# Patient Record
Sex: FEMALE | Race: WHITE | URBAN - METROPOLITAN AREA
[De-identification: names, ages, dates, MRNs, and addresses within clinical notes are randomized per-mention and may not be internally consistent; named-entity substitution may affect disease eponyms.]

---
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
## 2023-03-26 ENCOUNTER — OFFICE VISIT (OUTPATIENT)
Dept: URGENT CARE | Age: 18
End: 2023-03-26

## 2023-03-26 VITALS
TEMPERATURE: 97.7 F | RESPIRATION RATE: 16 BRPM | WEIGHT: 170.4 LBS | BODY MASS INDEX: 33.45 KG/M2 | SYSTOLIC BLOOD PRESSURE: 112 MMHG | DIASTOLIC BLOOD PRESSURE: 75 MMHG | OXYGEN SATURATION: 99 % | HEIGHT: 60 IN | HEART RATE: 88 BPM

## 2023-03-26 DIAGNOSIS — R11.2 NAUSEA AND VOMITING, UNSPECIFIED VOMITING TYPE: Primary | ICD-10-CM

## 2023-03-26 LAB
BILIRUBIN, POC: NORMAL
BLOOD URINE, POC: NORMAL
CLARITY, POC: CLEAR
COLOR, POC: YELLOW
CONTROL: NORMAL
GLUCOSE URINE, POC: NORMAL
KETONES, POC: NORMAL
LEUKOCYTE EST, POC: NORMAL
NITRITE, POC: NORMAL
PH, POC: 6
PREGNANCY TEST URINE, POC: NEGATIVE
PROTEIN, POC: NORMAL
SPECIFIC GRAVITY, POC: 1.02
UROBILINOGEN, POC: NORMAL

## 2023-03-26 RX ORDER — ONDANSETRON 4 MG/1
4 TABLET, ORALLY DISINTEGRATING ORAL 3 TIMES DAILY PRN
Qty: 21 TABLET | Refills: 0 | Status: SHIPPED | OUTPATIENT
Start: 2023-03-26

## 2023-03-26 ASSESSMENT — ENCOUNTER SYMPTOMS
ABDOMINAL DISTENTION: 0
CONSTIPATION: 0
RHINORRHEA: 0
SINUS PRESSURE: 0
ABDOMINAL PAIN: 1
ANAL BLEEDING: 0
NAUSEA: 1
SINUS PAIN: 0
VOMITING: 1
BLOOD IN STOOL: 0
DIARRHEA: 1
RECTAL PAIN: 0

## 2023-03-26 NOTE — PATIENT INSTRUCTIONS
The 'BRAT' diet is suggested, then progress to diet as tolerated as symptoms tammi. See PCP if you develop bloody stools, persistent diarrhea, vomiting, fever or abdominal pain. Recommend bland diet, advance diet as tolerated.   Push fluids (water)  BRAT diet  B: bananas  R: Rice or rice cereal  A: Apples, applesauce  T: Toast, crackers

## 2023-03-26 NOTE — PROGRESS NOTES
Shade Bello (:  2005) is a 21 y.o. female,New patient, here for evaluation of the following chief complaint(s):  Emesis (Has been vomiting. She'll be ok but when she eats she'll puke. Has diarrhea, stomach in knots. Its been going on since yesterday at about 5pm. )    ASSESSMENT/PLAN:  1. Nausea and vomiting, unspecified vomiting type  The 'BRAT' diet is suggested, then progress to diet as tolerated as symptoms tammi. See PCP if you develop bloody stools, persistent diarrhea, vomiting, fever or abdominal pain. Advance diet as tolerated. Push fluids (water)  BRAT diet  B: bananas  R: Rice or rice cereal  A: Apples, applesauce  T: Toast, crackers  - POCT Urinalysis no Micro  - POCT urine pregnancy    - POCT Urinalysis no Micro  - POCT urine pregnancy  - ondansetron (ZOFRAN-ODT) 4 MG disintegrating tablet; Take 1 tablet by mouth 3 times daily as needed for Nausea or Vomiting  Dispense: 21 tablet; Refill: 0  Follow up in 7 days with PCP if symptoms persist or if symptoms worsen. SUBJECTIVE/OBJECTIVE:  21 y.o. female presents with symptoms of vomiting for two days. Not keeping anything down. Father and brother had same thing last week. Emesis   This is a new problem. The current episode started in the past 7 days. The problem occurs intermittently. The problem has been gradually worsening. The emesis has an appearance of stomach contents. The maximum temperature recorded prior to her arrival was 100.4 - 100.9 F. The fever has been present for 1 to 2 days. Associated symptoms include abdominal pain, chills, diarrhea and a fever. Pertinent negatives include no dizziness or headaches. Risk factors include ill contacts. She has tried nothing for the symptoms. Review of Systems   Constitutional:  Positive for chills and fever. Negative for diaphoresis and fatigue. HENT:  Negative for congestion, postnasal drip, rhinorrhea, sinus pressure and sinus pain.     Gastrointestinal:  Positive for

## 2023-03-26 NOTE — LETTER
March 26, 2023       Lorraine Laguna YOB: 2005   [address]  [facility] [address] Date of Visit:  3/26/2023       To Whom It May Concern: It is my medical opinion that Lender Fiddler should remain out of work until 3/28/23. If you have any questions or concerns, please don't hesitate to call.     Sincerely,        Mark Echols, APRN - CNP

## 2023-04-05 ENCOUNTER — OFFICE VISIT (OUTPATIENT)
Dept: URGENT CARE | Age: 18
End: 2023-04-05

## 2023-04-05 VITALS
SYSTOLIC BLOOD PRESSURE: 126 MMHG | BODY MASS INDEX: 33.23 KG/M2 | DIASTOLIC BLOOD PRESSURE: 80 MMHG | HEIGHT: 61 IN | HEART RATE: 82 BPM | WEIGHT: 176 LBS | OXYGEN SATURATION: 97 % | RESPIRATION RATE: 18 BRPM | TEMPERATURE: 98.1 F

## 2023-04-05 DIAGNOSIS — J06.9 VIRAL URI: Primary | ICD-10-CM

## 2023-04-05 LAB
Lab: NORMAL
PERFORMING INSTRUMENT: NORMAL
QC PASS/FAIL: NORMAL
SARS-COV-2, POC: NORMAL
STREPTOCOCCUS A RNA: NEGATIVE

## 2023-04-05 RX ORDER — BROMPHENIRAMINE MALEATE, PSEUDOEPHEDRINE HYDROCHLORIDE, AND DEXTROMETHORPHAN HYDROBROMIDE 2; 30; 10 MG/5ML; MG/5ML; MG/5ML
SYRUP ORAL
Qty: 200 ML | Refills: 0 | Status: SHIPPED | OUTPATIENT
Start: 2023-04-05

## 2023-04-05 ASSESSMENT — ENCOUNTER SYMPTOMS
VOMITING: 0
COUGH: 1
NAUSEA: 0
CONSTIPATION: 0
SORE THROAT: 1
EYES NEGATIVE: 1
DIARRHEA: 1
SINUS PAIN: 1
SINUS PRESSURE: 1

## 2023-04-05 NOTE — PROGRESS NOTES
Jeanne Seymour (:  2005) is a 21 y.o. female,New patient, here for evaluation of the following chief complaint(s):  Fever and Pharyngitis (Believes she had recent strep exposure)      ASSESSMENT/PLAN:    ICD-10-CM    1. Viral URI  J06.9 POCT Rapid Strep A DNA (Alere i)     POCT COVID-19, Antigen     brompheniramine-pseudoephedrine-DM 2-30-10 MG/5ML syrup      POC COVID test negative   Results for POC orders placed in visit on 23   POCT Rapid Strep A DNA (Alere i)   Result Value Ref Range    Streptococcus A RNA negative          Reviewed AVS with patient. All questions answered    Return if symptoms worsen or fail to improve. SUBJECTIVE/OBJECTIVE:  21year old female presents with c/o sinus congestion and sore throat for 24 hours. She states that she has had chills al day. Denies known fever. Has exposure to her family with similar symptoms. She has not been treating with OTC medications. History provided by:  Patient   used: No      Vitals:    23 1627   BP: 126/80   Site: Left Upper Arm   Position: Sitting   Cuff Size: Medium Adult   Pulse: 82   Resp: 18   Temp: 98.1 °F (36.7 °C)   TempSrc: Oral   SpO2: 97%   Weight: 176 lb (79.8 kg)   Height: 5' 1\" (1.549 m)       Review of Systems   Constitutional:  Positive for chills and fatigue. Negative for appetite change and fever. HENT:  Positive for congestion, sinus pressure, sinus pain and sore throat. Negative for ear pain. Eyes: Negative. Respiratory:  Positive for cough. Congested non-productive cough    Cardiovascular: Negative. Gastrointestinal:  Positive for diarrhea. Negative for constipation, nausea and vomiting. Neurological:  Positive for headaches. Physical Exam  Vitals reviewed. Constitutional:       General: She is not in acute distress. Appearance: She is obese. She is ill-appearing. HENT:      Head: Normocephalic.       Right Ear: Tympanic membrane, ear canal and

## 2023-04-05 NOTE — LETTER
April 5, 2023       Rylee Lockie Ground YOB: 2005   [address] [facility],[address] Date of Visit:  4/5/2023       To Whom It May Concern: It is my medical opinion that Philip Standing may return to work on 4/8/23. If you have any questions or concerns, please don't hesitate to call.     Sincerely,          PANCHO Herrera - CNP

## 2023-08-18 ENCOUNTER — OFFICE VISIT (OUTPATIENT)
Dept: URGENT CARE | Age: 18
End: 2023-08-18

## 2023-08-18 ENCOUNTER — HOSPITAL ENCOUNTER (EMERGENCY)
Age: 18
Discharge: HOME | End: 2023-08-18
Payer: COMMERCIAL

## 2023-08-18 VITALS
TEMPERATURE: 99.3 F | HEART RATE: 97 BPM | OXYGEN SATURATION: 100 % | SYSTOLIC BLOOD PRESSURE: 112 MMHG | HEIGHT: 61 IN | WEIGHT: 190 LBS | DIASTOLIC BLOOD PRESSURE: 74 MMHG | RESPIRATION RATE: 16 BRPM | BODY MASS INDEX: 35.87 KG/M2

## 2023-08-18 VITALS
TEMPERATURE: 98.5 F | SYSTOLIC BLOOD PRESSURE: 100 MMHG | OXYGEN SATURATION: 95 % | WEIGHT: 192 LBS | BODY MASS INDEX: 37.69 KG/M2 | RESPIRATION RATE: 18 BRPM | DIASTOLIC BLOOD PRESSURE: 68 MMHG | HEART RATE: 61 BPM | HEIGHT: 60 IN

## 2023-08-18 DIAGNOSIS — T14.8XXA INFECTED WOUND: Primary | ICD-10-CM

## 2023-08-18 DIAGNOSIS — L08.9 INFECTED WOUND: Primary | ICD-10-CM

## 2023-08-18 DIAGNOSIS — L03.90 CELLULITIS OF SKIN: ICD-10-CM

## 2023-08-18 DIAGNOSIS — L03.113 CELLULITIS OF RIGHT UPPER EXTREMITY: ICD-10-CM

## 2023-08-18 PROCEDURE — 6370000000 HC RX 637 (ALT 250 FOR IP): Performed by: PHYSICIAN ASSISTANT

## 2023-08-18 PROCEDURE — 99283 EMERGENCY DEPT VISIT LOW MDM: CPT

## 2023-08-18 RX ORDER — CLINDAMYCIN HYDROCHLORIDE 150 MG/1
300 CAPSULE ORAL ONCE
Status: COMPLETED | OUTPATIENT
Start: 2023-08-18 | End: 2023-08-18

## 2023-08-18 RX ORDER — ONDANSETRON 4 MG/1
4 TABLET, ORALLY DISINTEGRATING ORAL ONCE
Status: COMPLETED | OUTPATIENT
Start: 2023-08-18 | End: 2023-08-18

## 2023-08-18 RX ORDER — CLINDAMYCIN HYDROCHLORIDE 300 MG/1
300 CAPSULE ORAL 3 TIMES DAILY
Qty: 21 CAPSULE | Refills: 0 | Status: SHIPPED | OUTPATIENT
Start: 2023-08-18 | End: 2023-08-25

## 2023-08-18 RX ORDER — DEXTROAMPHETAMINE SACCHARATE, AMPHETAMINE ASPARTATE MONOHYDRATE, DEXTROAMPHETAMINE SULFATE AND AMPHETAMINE SULFATE 5; 5; 5; 5 MG/1; MG/1; MG/1; MG/1
2 CAPSULE, EXTENDED RELEASE ORAL
COMMUNITY
Start: 2023-05-13

## 2023-08-18 RX ORDER — ONDANSETRON 4 MG/1
4 TABLET, FILM COATED ORAL EVERY 8 HOURS PRN
Qty: 10 TABLET | Refills: 0 | Status: SHIPPED | OUTPATIENT
Start: 2023-08-18

## 2023-08-18 RX ORDER — CEPHALEXIN 500 MG/1
500 CAPSULE ORAL 4 TIMES DAILY
Qty: 28 CAPSULE | Refills: 0 | Status: SHIPPED | OUTPATIENT
Start: 2023-08-18 | End: 2023-08-20

## 2023-08-18 RX ADMIN — ONDANSETRON 4 MG: 4 TABLET, ORALLY DISINTEGRATING ORAL at 18:56

## 2023-08-18 RX ADMIN — CLINDAMYCIN HYDROCHLORIDE 300 MG: 150 CAPSULE ORAL at 18:56

## 2023-08-18 ASSESSMENT — LIFESTYLE VARIABLES
HOW MANY STANDARD DRINKS CONTAINING ALCOHOL DO YOU HAVE ON A TYPICAL DAY: PATIENT DOES NOT DRINK
HOW OFTEN DO YOU HAVE A DRINK CONTAINING ALCOHOL: NEVER

## 2023-08-18 ASSESSMENT — ENCOUNTER SYMPTOMS: RESPIRATORY NEGATIVE: 1

## 2023-08-18 ASSESSMENT — PAIN - FUNCTIONAL ASSESSMENT: PAIN_FUNCTIONAL_ASSESSMENT: NONE - DENIES PAIN

## 2023-08-18 NOTE — ED PROVIDER NOTES
[address] [address] ED  EMERGENCY DEPARTMENT ENCOUNTER        Pt Name: Yariel Davalos  MRN: [id number]  [address] [facility] 2005  Date of evaluation: 8/18/2023  Provider: Bart Benito PA-C  PCP: Glo Doe  Note Started: 6:59 PM EDT 8/18/23      AL. I have evaluated this patient. 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 Pt went to urgent care was given antibiotics, and right after she left started c/o fever body aches muscle pain and n/v. But then stated earlier at 2pm fever was 102 orally and took tylenol at that time. HISTORY OF PRESENT ILLNESS: 1 or more Elements     History From: Patient  Limitations to history : None    Rylee Vanita Munster is a 21 y.o. female who presents to the emergency department for evaluation of scrape on her arm that has become red painful and swollen went to urgent care today was started on Keflex states after taking the medication made her sick and vomited. No rash. No trouble breathing or swallowing. Nursing Notes were all reviewed and agreed with or any disagreements were addressed in the HPI. REVIEW OF SYSTEMS :      Review of Systems    Positives and Pertinent negatives as per HPI. SURGICAL HISTORY     Past Surgical History:   Procedure Laterality Date    HAND SURGERY Left 05/23/2017    CO2 laser    OTHER SURGICAL HISTORY      co2 laser       CURRENTMEDICATIONS       Discharge Medication List as of 8/18/2023  7:17 PM        CONTINUE these medications which have NOT CHANGED    Details   amphetamine-dextroamphetamine (ADDERALL XR) 20 MG extended release capsule Take 2 capsules by mouth. Historical Med      cephALEXin (KEFLEX) 500 MG capsule Take 1 capsule by mouth 4 times daily for 7 days, Disp-28 capsule, R-0Normal      mupirocin (BACTROBAN) 2 % ointment Apply topically 3 times daily for 7 days. , Disp-1 each, 1. Infected wound    2.  Cellulitis of skin          DISPOSITION/PLAN     DISPOSITION Decision to Discharge    PATIENT REFERRED TO:  Lorie Doe  [id number]  [address]    In 3 days      [facility]  [facility]  [phone]    If symptoms worsen      DISCHARGE MEDICATIONS:  Discharge Medication List as of 8/18/2023  7:17 PM        START taking these medications    Details   clindamycin (CLEOCIN) 300 MG capsule Take 1 capsule by mouth 3 times daily for 7 days, Disp-21 capsule, R-0Normal      ondansetron (ZOFRAN) 4 MG tablet Take 1 tablet by mouth every 8 hours as needed for Nausea, Disp-10 tablet, R-0Normal             DISCONTINUED MEDICATIONS:  Discharge Medication List as of 8/18/2023  7:17 PM                 (Please note that portions of this note were completed with a voice recognition program.  Efforts were made to edit the dictations but occasionally words are mis-transcribed.)    Ld Crawford PA-C (electronically signed)           Omar Morgan PA-C  08/18/23 1946

## 2023-08-19 ENCOUNTER — HOSPITAL ENCOUNTER (EMERGENCY)
Age: 18
Discharge: HOME | End: 2023-08-20
Attending: STUDENT IN AN ORGANIZED HEALTH CARE EDUCATION/TRAINING PROGRAM
Payer: COMMERCIAL

## 2023-08-19 VITALS
HEART RATE: 96 BPM | WEIGHT: 193 LBS | HEIGHT: 61 IN | RESPIRATION RATE: 16 BRPM | TEMPERATURE: 98.2 F | DIASTOLIC BLOOD PRESSURE: 71 MMHG | OXYGEN SATURATION: 98 % | BODY MASS INDEX: 36.44 KG/M2 | SYSTOLIC BLOOD PRESSURE: 119 MMHG

## 2023-08-19 DIAGNOSIS — L03.113 CELLULITIS OF RIGHT UPPER EXTREMITY: Primary | ICD-10-CM

## 2023-08-19 PROCEDURE — 99284 EMERGENCY DEPT VISIT MOD MDM: CPT

## 2023-08-19 ASSESSMENT — PAIN DESCRIPTION - ORIENTATION: ORIENTATION: RIGHT

## 2023-08-19 ASSESSMENT — PAIN DESCRIPTION - DESCRIPTORS: DESCRIPTORS: THROBBING

## 2023-08-19 ASSESSMENT — PAIN - FUNCTIONAL ASSESSMENT: PAIN_FUNCTIONAL_ASSESSMENT: 0-10

## 2023-08-19 ASSESSMENT — PAIN DESCRIPTION - PAIN TYPE: TYPE: ACUTE PAIN

## 2023-08-19 ASSESSMENT — PAIN DESCRIPTION - LOCATION: LOCATION: ARM

## 2023-08-20 ENCOUNTER — APPOINTMENT (OUTPATIENT)
Dept: GENERAL RADIOLOGY | Age: 18
End: 2023-08-20
Payer: COMMERCIAL

## 2023-08-20 LAB — HCG UR QL: NEGATIVE

## 2023-08-20 PROCEDURE — 84703 CHORIONIC GONADOTROPIN ASSAY: CPT

## 2023-08-20 PROCEDURE — 73090 X-RAY EXAM OF FOREARM: CPT

## 2023-08-20 PROCEDURE — 6370000000 HC RX 637 (ALT 250 FOR IP): Performed by: STUDENT IN AN ORGANIZED HEALTH CARE EDUCATION/TRAINING PROGRAM

## 2023-08-20 RX ORDER — CEFUROXIME AXETIL 500 MG/1
500 TABLET ORAL 2 TIMES DAILY
Qty: 20 TABLET | Refills: 0 | Status: SHIPPED | OUTPATIENT
Start: 2023-08-20 | End: 2023-08-30

## 2023-08-20 RX ORDER — SULFAMETHOXAZOLE AND TRIMETHOPRIM 800; 160 MG/1; MG/1
1 TABLET ORAL ONCE
Status: COMPLETED | OUTPATIENT
Start: 2023-08-20 | End: 2023-08-20

## 2023-08-20 RX ORDER — SULFAMETHOXAZOLE AND TRIMETHOPRIM 800; 160 MG/1; MG/1
1 TABLET ORAL 2 TIMES DAILY
Qty: 14 TABLET | Refills: 0 | Status: SHIPPED | OUTPATIENT
Start: 2023-08-20 | End: 2023-08-27

## 2023-08-20 RX ORDER — CEFUROXIME AXETIL 250 MG/1
500 TABLET ORAL ONCE
Status: COMPLETED | OUTPATIENT
Start: 2023-08-20 | End: 2023-08-20

## 2023-08-20 RX ADMIN — SULFAMETHOXAZOLE AND TRIMETHOPRIM 1 TABLET: 800; 160 TABLET ORAL at 01:10

## 2023-08-20 RX ADMIN — CEFUROXIME AXETIL 500 MG: 250 TABLET ORAL at 01:10

## 2023-08-20 NOTE — ED TRIAGE NOTES
Pt states that she was here yesterday and was diagnosed with cellulitis and was given clindamycin. Pt states that the wound has gotten much bigger and worse. She has taken 2 of her abx.

## 2023-08-20 NOTE — DISCHARGE INSTRUCTIONS
Follow-up with primary doctor on Monday for reevaluation. Return to emergency department for any worsening redness, worsening pain any fevers, chest pain or shortness of breath, lightheadedness or dizziness or any new change or worsening symptoms were always here for evaluation never hesitate to return.

## 2023-08-21 ENCOUNTER — HOSPITAL ENCOUNTER (EMERGENCY)
Age: 18
Discharge: HOME | End: 2023-08-21
Attending: STUDENT IN AN ORGANIZED HEALTH CARE EDUCATION/TRAINING PROGRAM
Payer: COMMERCIAL

## 2023-08-21 VITALS
HEIGHT: 61 IN | HEART RATE: 63 BPM | TEMPERATURE: 98.4 F | DIASTOLIC BLOOD PRESSURE: 93 MMHG | RESPIRATION RATE: 16 BRPM | OXYGEN SATURATION: 97 % | WEIGHT: 193 LBS | BODY MASS INDEX: 36.44 KG/M2 | SYSTOLIC BLOOD PRESSURE: 125 MMHG

## 2023-08-21 DIAGNOSIS — L03.113 CELLULITIS OF RIGHT UPPER EXTREMITY: Primary | ICD-10-CM

## 2023-08-21 LAB
ALBUMIN SERPL-MCNC: 4.1 G/DL (ref 3.4–5)
ALBUMIN/GLOB SERPL: 1.4 {RATIO} (ref 1.1–2.2)
ALP SERPL-CCNC: 106 U/L (ref 40–129)
ALT SERPL-CCNC: 34 U/L (ref 10–40)
ANION GAP SERPL CALCULATED.3IONS-SCNC: 12 MMOL/L (ref 3–16)
AST SERPL-CCNC: 20 U/L (ref 15–37)
BASOPHILS # BLD: 0.1 K/UL (ref 0–0.2)
BASOPHILS NFR BLD: 0.5 %
BILIRUB SERPL-MCNC: <0.2 MG/DL (ref 0–1)
BUN SERPL-MCNC: 11 MG/DL (ref 7–20)
CALCIUM SERPL-MCNC: 9.2 MG/DL (ref 8.3–10.6)
CHLORIDE SERPL-SCNC: 104 MMOL/L (ref 99–110)
CK SERPL-CCNC: 89 U/L (ref 26–192)
CO2 SERPL-SCNC: 23 MMOL/L (ref 21–32)
CREAT SERPL-MCNC: 0.8 MG/DL (ref 0.6–1.1)
DEPRECATED RDW RBC AUTO: 13.3 % (ref 12.4–15.4)
EOSINOPHIL # BLD: 0.1 K/UL (ref 0–0.6)
EOSINOPHIL NFR BLD: 1.2 %
GFR SERPLBLD CREATININE-BSD FMLA CKD-EPI: >60 ML/MIN/{1.73_M2}
GLUCOSE SERPL-MCNC: 103 MG/DL (ref 70–99)
HCT VFR BLD AUTO: 38 % (ref 36–48)
HGB BLD-MCNC: 12.9 G/DL (ref 12–16)
LACTATE BLDV-SCNC: 0.9 MMOL/L (ref 0.4–2)
LYMPHOCYTES # BLD: 3.4 K/UL (ref 1–5.1)
LYMPHOCYTES NFR BLD: 26.6 %
MCH RBC QN AUTO: 29.1 PG (ref 26–34)
MCHC RBC AUTO-ENTMCNC: 33.9 G/DL (ref 31–36)
MCV RBC AUTO: 85.8 FL (ref 80–100)
MONOCYTES # BLD: 1.1 K/UL (ref 0–1.3)
MONOCYTES NFR BLD: 8.9 %
NEUTROPHILS # BLD: 8.1 K/UL (ref 1.7–7.7)
NEUTROPHILS NFR BLD: 62.8 %
PLATELET # BLD AUTO: 247 K/UL (ref 135–450)
PMV BLD AUTO: 8.5 FL (ref 5–10.5)
POTASSIUM SERPL-SCNC: 3.9 MMOL/L (ref 3.5–5.1)
PROT SERPL-MCNC: 7.1 G/DL (ref 6.4–8.2)
RBC # BLD AUTO: 4.44 M/UL (ref 4–5.2)
SODIUM SERPL-SCNC: 139 MMOL/L (ref 136–145)
WBC # BLD AUTO: 12.8 K/UL (ref 4–11)

## 2023-08-21 PROCEDURE — 99283 EMERGENCY DEPT VISIT LOW MDM: CPT

## 2023-08-21 PROCEDURE — 36415 COLL VENOUS BLD VENIPUNCTURE: CPT

## 2023-08-21 PROCEDURE — 83605 ASSAY OF LACTIC ACID: CPT

## 2023-08-21 PROCEDURE — 80053 COMPREHEN METABOLIC PANEL: CPT

## 2023-08-21 PROCEDURE — 82550 ASSAY OF CK (CPK): CPT

## 2023-08-21 PROCEDURE — 85025 COMPLETE CBC W/AUTO DIFF WBC: CPT

## 2023-08-21 ASSESSMENT — PAIN SCALES - GENERAL: PAINLEVEL_OUTOF10: 5

## 2023-08-21 ASSESSMENT — PAIN - FUNCTIONAL ASSESSMENT: PAIN_FUNCTIONAL_ASSESSMENT: 0-10

## 2023-08-21 ASSESSMENT — PAIN DESCRIPTION - LOCATION: LOCATION: ARM

## 2023-08-21 ASSESSMENT — PAIN DESCRIPTION - ORIENTATION: ORIENTATION: RIGHT

## 2023-08-21 ASSESSMENT — PAIN DESCRIPTION - PAIN TYPE: TYPE: ACUTE PAIN

## 2023-08-21 NOTE — ED TRIAGE NOTES
Pt was here yesterday and the day before for cellulitis of the Right upper extremity. Pt c/o tonight of arm being bruised.

## 2023-08-21 NOTE — DISCHARGE INSTRUCTIONS
Follow-up with primary doctor on Monday or Tuesday for reevaluation. Return to emergency department for worsening redness, worsening swelling, worsening pain any motor or sensory changes, chest pain or shortness of breath, fevers or any new change or worsening symptoms were always here for evaluation never hesitate to return. Continue taking cefuroxime and Bactrim that I prescribed yesterday as the redness does seem to be improving.

## 2023-08-31 NOTE — ED PROVIDER NOTES
[address] [place] [address]  [phone]    If symptoms worsen    Disposition medications (if applicable):  Discharge Medication List as of 8/20/2023  1:12 AM        START taking these medications    Details   cefUROXime (CEFTIN) 500 MG tablet Take 1 tablet by mouth 2 times daily for 10 days, Disp-20 tablet, R-0Normal      sulfamethoxazole-trimethoprim (BACTRIM DS;SEPTRA DS) 800-160 MG per tablet Take 1 tablet by mouth 2 times daily for 7 days, Disp-14 tablet, R-0Normal           ED Provider Disposition Time  DISPOSITION Decision To Discharge 08/20/2023 01:07:09 AM      Comment: Please note this report has been produced using speech recognition software and may contain errors related to that system including errors in grammar, punctuation, and spelling, as well as words and phrases that may be inappropriate. Efforts were made to edit the dictations.         Dinesh Rhodes MD  08/31/23 0203

## 2023-08-31 NOTE — ED PROVIDER NOTES
Albumin/Globulin Ratio 1.4 1.1 - 2.2    Total Bilirubin <0.2 0.0 - 1.0 mg/dL    Alkaline Phosphatase 106 40 - 129 U/L    ALT 34 10 - 40 U/L    AST 20 15 - 37 U/L   Lactic Acid   Result Value Ref Range    Lactic Acid 0.9 0.4 - 2.0 mmol/L   CK   Result Value Ref Range    Total CK 89 26 - 192 U/L      Radiographs (if obtained):  Radiologist's Report Reviewed:  No results found. MDM:    20-year-old female present with history seen above. Vitals on presentation reassuring and patient afebrile satting 1 room air. Physical exam can be seen above. Patient is neurovascular intact. Erythema actually seems to be improving. I discussed with patient that cellulitis actually seems to be improving. I discussed we can obtain laboratory evaluation and then after labs I can review labs and discuss CT imaging. Patient has a mild leukocytosis of 12.8. Hemoglobin is within normal limits. CMP is overall reassuring and nonactionable. Lactate is not elevated CK is within normal limits. This will cellulitis is not rapidly progressing and actually improving. I offered CT imaging to patient but discussion with her and shared decision making of risk of radiation we will hold off on CT imaging at this time and continue oral antibiotics with strict return precautions and close follow-up. Patient is agreeable this plan she is agreeable to follow-up and return precautions patient discharged home. Clinical Impression:  1.  Cellulitis of right upper extremity      Disposition referral (if applicable):  Yumiko Doe  [phone]  [address]    In 1 day      [facility] PHYSICAL [facility] ED  [facility]  [phone]    If symptoms worsen    Disposition medications (if applicable):  Discharge Medication List as of 8/21/2023  2:27 AM        ED Provider Disposition Time  DISPOSITION Decision To Discharge 08/21/2023 02:23:55 AM      Comment: Please note this report has

## 2024-02-27 ENCOUNTER — OFFICE VISIT (OUTPATIENT)
Dept: URGENT CARE | Age: 19
End: 2024-02-27

## 2024-02-27 VITALS
HEART RATE: 70 BPM | SYSTOLIC BLOOD PRESSURE: 106 MMHG | WEIGHT: 220 LBS | TEMPERATURE: 97.4 F | BODY MASS INDEX: 43.19 KG/M2 | DIASTOLIC BLOOD PRESSURE: 70 MMHG | OXYGEN SATURATION: 96 % | HEIGHT: 60 IN

## 2024-02-27 DIAGNOSIS — R21 RASH: Primary | ICD-10-CM

## 2024-02-27 PROCEDURE — 99282 EMERGENCY DEPT VISIT SF MDM: CPT

## 2024-02-27 RX ORDER — METHYLPREDNISOLONE 4 MG/1
TABLET ORAL
Qty: 1 KIT | Refills: 0 | Status: SHIPPED | OUTPATIENT
Start: 2024-02-27

## 2024-02-27 RX ORDER — FLUOXETINE 10 MG/1
10 CAPSULE ORAL DAILY
COMMUNITY

## 2024-02-27 NOTE — PATIENT INSTRUCTIONS
Medrol Dose pack prescribed for steroid treatment - take as directed.  Can use an oral antihistamine to help with itching - Claritin, Zyrtec, or Allegra  Cool compresses, or epsom salt soaked compresses can also help.    Follow up with your PCP within 7 days or, if unable, you can return to the clinic if symptoms persist beyond 7 days or if symptoms worsen.    New Prescriptions    METHYLPREDNISOLONE (MEDROL DOSEPACK) 4 MG TABLET    Take by mouth.

## 2024-02-27 NOTE — PROGRESS NOTES
Rylee Kay Spears (: 2005) is a 18 y.o. female, Established patient, here for evaluation of the following chief complaint(s):  Insect Bite (Pt states she has bites all over body from her waist down. Super itchy.  Noticed them this morning and she states they are painful too. )      ASSESSMENT/PLAN:    ICD-10-CM    1. Rash  R21 methylPREDNISolone (MEDROL DOSEPACK) 4 MG tablet          Exam and history consistent with insect bites vs mild urticarial rash.  Differential also includes contact dermatitis given the clear borders of the rash.  Low concern for burns, cellulitis, folliculitis, and ringworm.  Medrol dose pack prescribed for systemic treatment given diffuse presentation over both legs.  Recommend OTC oral antihistamines to help with itching   Also recommend cool compresses, or epsom salt soaked compresses    Follow up in 7 days if symptoms persist or if symptoms worsen.  The patient tolerated their visit well. The patient and/or the family were informed of the results of any tests, a time was given to answer questions, a plan was proposed and they agreed with plan. Reviewed AVS with treatment instructions and answered questions - pt/family expresses understanding and agreement with the discussed treatment plan and AVS instructions.    SUBJECTIVE/OBJECTIVE:  HPI:   18 y.o. female presents for complaint of insect bites? Down both legs x last night.  States she was outdoors last night due to the good weather, states they developed overnight.   Pt states she was wearing long pants yesterday. Denies seeing any insects, such as mosquitos, while out.    Denies any new soaps, detergents, lotions, medications, or foods.    Admits symptoms of itchy red bumps down both legs starting at the buttocks.   Denies symptoms of fevers, sweats, chills, nausea, vomiting, diarrhea.    Nothing makes symptoms better.  Scratching makes symptoms worse.    Has taken benadryl and calamine lotion for symptoms without any noted

## 2024-02-28 ENCOUNTER — HOSPITAL ENCOUNTER (EMERGENCY)
Age: 19
Discharge: HOME | End: 2024-02-28
Attending: EMERGENCY MEDICINE
Payer: COMMERCIAL

## 2024-02-28 VITALS
WEIGHT: 210 LBS | BODY MASS INDEX: 41.23 KG/M2 | TEMPERATURE: 98.3 F | HEIGHT: 60 IN | SYSTOLIC BLOOD PRESSURE: 135 MMHG | RESPIRATION RATE: 18 BRPM | HEART RATE: 65 BPM | OXYGEN SATURATION: 97 % | DIASTOLIC BLOOD PRESSURE: 87 MMHG

## 2024-02-28 DIAGNOSIS — L30.9 DERMATITIS: Primary | ICD-10-CM

## 2024-02-28 ASSESSMENT — PAIN - FUNCTIONAL ASSESSMENT
PAIN_FUNCTIONAL_ASSESSMENT: NONE - DENIES PAIN
PAIN_FUNCTIONAL_ASSESSMENT: NONE - DENIES PAIN

## 2024-02-28 NOTE — DISCHARGE INSTRUCTIONS
We cannot verify bedbugs at this time but your rash does most closely resemble bedbug bites.  See enclosed instruction sheet to help with information regarding bedbug.  Start your steroids as prescribed by the other facility

## 2024-02-28 NOTE — ED PROVIDER NOTES
AMPHETAMINE-DEXTROAMPHETAMINE (ADDERALL XR) 20 MG EXTENDED RELEASE CAPSULE    Take 2 capsules by mouth.    BROMPHENIRAMINE-PSEUDOEPHEDRINE-DM 2-30-10 MG/5ML SYRUP    Take 10 ml every 6 hours as needed for cough and congestion for 5 days    FLUOXETINE (PROZAC) 10 MG CAPSULE    Take 1 capsule by mouth daily    METHYLPREDNISOLONE (MEDROL DOSEPACK) 4 MG TABLET    Take by mouth.    ONDANSETRON (ZOFRAN) 4 MG TABLET    Take 1 tablet by mouth every 8 hours as needed for Nausea    ONDANSETRON (ZOFRAN-ODT) 4 MG DISINTEGRATING TABLET    Take 1 tablet by mouth 3 times daily as needed for Nausea or Vomiting       ALLERGIES     Patient has no known allergies.    FAMILY HISTORY     History reviewed. No pertinent family history.       SOCIAL HISTORY       Social History     Socioeconomic History    Marital status: Single     Spouse name: None    Number of children: None    Years of education: None    Highest education level: None   Tobacco Use    Smoking status: Never    Smokeless tobacco: Never   Vaping Use    Vaping Use: Every day    Substances: Nicotine    Devices: Disposable   Substance and Sexual Activity    Alcohol use: Yes     Alcohol/week: 1.0 - 2.0 standard drink of alcohol     Types: 1 - 2 Standard drinks or equivalent per week     Comment: rare    Drug use: Yes     Types: Marijuana (Weed)    Sexual activity: Never       SCREENINGS                               CIWA Assessment  BP: 135/87  Pulse: 65                 PHYSICAL EXAM    (up to 7 for level 4, 8 or more for level 5)     ED Triage Vitals [02/28/24 0017]   BP Temp Temp Source Pulse Respirations SpO2 Height Weight - Scale   135/87 98.3 °F (36.8 °C) Oral 65 18 97 % 1.524 m (5') 95.3 kg (210 lb)       GEN: Well nourished, well developed, no acute distress  HEAD: Normocephalic, atraumatic.  No step-offs or deformities  EYES: Pupils equally round and reactive to light.  Extraocular movements intact.  Anicteric sclera  ENT: No nasal discharge.  No visible trauma.  Pink

## 2025-02-25 NOTE — ED NOTES
Reviewed discharge information. Patient verbalized understanding of paperwork, medication, and care instructions. Patient denied any questions.       Katja Tejada RN  08/21/23 5252 Trauma Discharge Instructions     Follow up appointments:  Follow up with trauma surgery in 2 weeks. If an appointment has not been scheduled prior to your discharge, please call [phone] to arrange the appointment.   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  Twice weekly labs will be drawn by home health nurse and monitored by trauma surgery.  Follow up with neurosurgery. An appointment has been scheduled on 3/19.  An order has been placed for repeat thoracic and lumbar spine xrays prior to follow up appointment.   Follow up with urology in 2-3 weeks  In the meantime, apply clobetasol cream twice daily for a total of 2 weeks  Follow up with your PCP in 1-2 weeks as needed.    Nutrition:   You are being discharged with an order for total parenteral nutrition (TPN). Infusion services have been arranged through home health. They will manage your TPN and assist with infusions as needed.   You are allowed to continue with sips of clear liquids until your trauma surgery follow up. It is encouraged to drink Gatorade/Pedialyte and soup broth if tolerated for electrolytes.   You will also receive some maintenance IV fluids during the day (home health nursing will assist with set up on Monday 3/3).  If you notice significant drain output or abdominal discomfort, please let your physician know.    Drain Care:   You are being discharged home with your GRACIELA drain. Monitor drain output and record data to be brought to your follow up appointment with trauma surgery.   Flush drain every few days to ensure it remains patent. Home health nursing can assist with this.     PICC Care:   You are being discharged home with a PICC line for continued infusions. Home health nurse will assist with PICC line care.     Additional home health services have been arranged for physical therapy, occupational therapy, and speech therapy. Home health should call to arrange a schedule.